# Patient Record
Sex: FEMALE | Race: WHITE | NOT HISPANIC OR LATINO | URBAN - METROPOLITAN AREA
[De-identification: names, ages, dates, MRNs, and addresses within clinical notes are randomized per-mention and may not be internally consistent; named-entity substitution may affect disease eponyms.]

---

## 2024-09-18 ENCOUNTER — INPATIENT (INPATIENT)
Facility: HOSPITAL | Age: 44
LOS: 1 days | Discharge: HOME | End: 2024-09-20
Attending: SPECIALIST | Admitting: SPECIALIST

## 2024-09-18 VITALS
SYSTOLIC BLOOD PRESSURE: 113 MMHG | TEMPERATURE: 98 F | RESPIRATION RATE: 16 BRPM | DIASTOLIC BLOOD PRESSURE: 74 MMHG | HEART RATE: 97 BPM

## 2024-09-18 DIAGNOSIS — Z98.890 OTHER SPECIFIED POSTPROCEDURAL STATES: Chronic | ICD-10-CM

## 2024-09-18 DIAGNOSIS — O26.899 OTHER SPECIFIED PREGNANCY RELATED CONDITIONS, UNSPECIFIED TRIMESTER: ICD-10-CM

## 2024-09-18 DIAGNOSIS — O42.90 PREMATURE RUPTURE OF MEMBRANES, UNSPECIFIED AS TO LENGTH OF TIME BETWEEN RUPTURE AND ONSET OF LABOR, UNSPECIFIED WEEKS OF GESTATION: ICD-10-CM

## 2024-09-18 RX ORDER — OXYTOCIN 10 UNIT/ML
AMPUL (ML) INJECTION
Qty: 30 | Refills: 0 | Status: DISCONTINUED | OUTPATIENT
Start: 2024-09-18 | End: 2024-09-19

## 2024-09-18 RX ORDER — OXYTOCIN 10 UNIT/ML
167 AMPUL (ML) INJECTION
Qty: 30 | Refills: 0 | Status: COMPLETED | OUTPATIENT
Start: 2024-09-18 | End: 2024-09-19

## 2024-09-18 RX ORDER — FLU VACCINE TS 2012-2013(5YR+) 45MCG/.5ML
0.5 VIAL (ML) INTRAMUSCULAR ONCE
Refills: 0 | Status: DISCONTINUED | OUTPATIENT
Start: 2024-09-18 | End: 2024-09-20

## 2024-09-18 RX ORDER — CHLORHEXIDINE GLUCONATE 40 MG/ML
1 SOLUTION TOPICAL DAILY
Refills: 0 | Status: DISCONTINUED | OUTPATIENT
Start: 2024-09-18 | End: 2024-09-19

## 2024-09-18 NOTE — OB PROVIDER H&P - PROBLEM SELECTOR PLAN 1
Admit for PROM @38.6wks  D/W Dr. Guillen  Routine Orders  GBS Negative 24  IOL with Pitocin   Epidural as needed  IUGR 5#14 today at [facility]   Social HX: [age] year old at home has terminal cancer     Risks, benefits, alternatives, and possible complications have been discussed in detail with the patient in her native language. Pre-admission, admission, and post admission procedures and expectations were discussed in detail. All questions answered, all appropriate hospital consents were signed. Anticipate normal vaginal delivery.   Informed consent was obtained. The following was discussed:   - Induction/augmentation of labor: use of medication and/or cook balloon to begin or enhance labor   - Obstetrical management including internal fetal/contraction monitoring   - Normal vaginal delivery   - Possible  section Admit for PROM @38.6wks  D/W Dr. Guillen  Routine Orders  GBS Negative 24  IOL with Pitocin   Epidural as needed  IUGR 5#14 today at [facility], AC lagging by 4 weeks as per [facility]  Social HX: [age] year old at home has terminal cancer     Risks, benefits, alternatives, and possible complications have been discussed in detail with the patient in her native language. Pre-admission, admission, and post admission procedures and expectations were discussed in detail. All questions answered, all appropriate hospital consents were signed. Anticipate normal vaginal delivery.   Informed consent was obtained. The following was discussed:   - Induction/augmentation of labor: use of medication and/or cook balloon to begin or enhance labor   - Obstetrical management including internal fetal/contraction monitoring   - Normal vaginal delivery   - Possible  section

## 2024-09-18 NOTE — OB RN TRIAGE NOTE - FALL HARM RISK - UNIVERSAL INTERVENTIONS
Bed in lowest position, wheels locked, appropriate side rails in place/Call bell, personal items and telephone in reach/Instruct patient to call for assistance before getting out of bed or chair/Non-slip footwear when patient is out of bed/[place] to call system/Physically safe environment - no spills, clutter or unnecessary equipment/Purposeful Proactive Rounding/Room/bathroom lighting operational, light cord in reach

## 2024-09-18 NOTE — OB PROVIDER H&P - NSLOWPPHRISK_OBGYN_A_OB
No previous uterine incision/Márquez Pregnancy/Less than or equal to 4 previous vaginal births/No known bleeding disorder/No history of postpartum hemorrhage/No other PPH risks indicated

## 2024-09-18 NOTE — OB PROVIDER H&P - NSHPPHYSICALEXAM_GEN_ALL_CORE
Vital Signs Last 24 Hrs  T(C): 36.6 (18 Sep 2024 22:16), Max: 36.9 (18 Sep 2024 21:54)  T(F): 97.88 (18 Sep 2024 22:16), Max: 98.4 (18 Sep 2024 21:54)  HR: 84 (18 Sep 2024 22:16) (84 - 97)  BP: 107/72 (18 Sep 2024 22:16) (107/72 - 113/74)  RR: 16 (18 Sep 2024 22:16) (16 - 16)    Assessment reveals VSS  General: Female sitting comfortably in no apparent distress  Neuro: No facial asymmetry, no slurred speech, moves all 4 extremities  Mood: Alert and lucid, appropriate mood and affect  A&Ox3  Lungs- clear bilateral  Heart- normal rate and regular rhythm  Extremities- Warm, Dry, no edema present, good pulses   Abdomen soft, NT, gravid  Cat 1 tracing reactive, occasional ctx on toco   Transabdominal Ultrasound- images saved ASOB  Sterile Speculum- positive nitrazine, positive ferning   Vaginal Exam- 1.5/70/-32        PLAN: Admit for PROM

## 2024-09-18 NOTE — OB PROVIDER H&P - NS_DILATION_OBGYN_ALL_OB_NU
----- Message from Daiana Tejada sent at 1/11/2024 11:19 AM CST -----  Regarding: Experiencing Pain  Contact: Pt @[phone]  Pt is calling to speak to someone in the office to discuss symptoms they are having. Pt is asking for a call back today. Please call to advise. Thanks.         Symptoms: Sciatic Nerve          How long has patient had these symptoms: Two days            1.5

## 2024-09-18 NOTE — OB PROVIDER H&P - HISTORY OF PRESENT ILLNESS
45y/o  @38.6wks presents with leaking of clear fluid since , Patient is scheduled for IOL tomorrow 24 for IUGR  Reports good fetal movement  Denies VB    Allergies: PCN as a child, unsure of reaction   Medications: PNV  NPO [id number]

## 2024-09-18 NOTE — OB PROVIDER H&P - NS_OBGYNHISTORY_OBGYN_ALL_OB_FT
GYN: Denies   OB:   TOP x 1  SAB x 1   17 6-12   20 5-9   22 6-9, currently has terminal cancer     AP course uncomplicated   1. IUGR, AC lagging by 4 weeks

## 2024-09-19 LAB
BASOPHILS # BLD AUTO: 0.04 K/UL — SIGNIFICANT CHANGE UP (ref 0–0.2)
BASOPHILS NFR BLD AUTO: 0.3 % — SIGNIFICANT CHANGE UP (ref 0–2)
BLD GP AB SCN SERPL QL: NEGATIVE — SIGNIFICANT CHANGE UP
EOSINOPHIL # BLD AUTO: 0.06 K/UL — SIGNIFICANT CHANGE UP (ref 0–0.5)
EOSINOPHIL NFR BLD AUTO: 0.5 % — SIGNIFICANT CHANGE UP (ref 0–6)
HCT VFR BLD CALC: 36.1 % — SIGNIFICANT CHANGE UP (ref 34.5–45)
HGB BLD-MCNC: 12.3 G/DL — SIGNIFICANT CHANGE UP (ref 11.5–15.5)
IANC: 9.06 K/UL — HIGH (ref 1.8–7.4)
IMM GRANULOCYTES NFR BLD AUTO: 0.2 % — SIGNIFICANT CHANGE UP (ref 0–0.9)
LYMPHOCYTES # BLD AUTO: 18.8 % — SIGNIFICANT CHANGE UP (ref 13–44)
LYMPHOCYTES # BLD AUTO: 2.35 K/UL — SIGNIFICANT CHANGE UP (ref 1–3.3)
MCHC RBC-ENTMCNC: 31.9 PG — SIGNIFICANT CHANGE UP (ref 27–34)
MCHC RBC-ENTMCNC: 34.1 GM/DL — SIGNIFICANT CHANGE UP (ref 32–36)
MCV RBC AUTO: 93.8 FL — SIGNIFICANT CHANGE UP (ref 80–100)
MONOCYTES # BLD AUTO: 0.94 K/UL — HIGH (ref 0–0.9)
MONOCYTES NFR BLD AUTO: 7.5 % — SIGNIFICANT CHANGE UP (ref 2–14)
NEUTROPHILS # BLD AUTO: 9.06 K/UL — HIGH (ref 1.8–7.4)
NEUTROPHILS NFR BLD AUTO: 72.7 % — SIGNIFICANT CHANGE UP (ref 43–77)
NRBC # BLD: 0 /100 WBCS — SIGNIFICANT CHANGE UP (ref 0–0)
NRBC # FLD: 0 K/UL — SIGNIFICANT CHANGE UP (ref 0–0)
PLATELET # BLD AUTO: 183 K/UL — SIGNIFICANT CHANGE UP (ref 150–400)
RBC # BLD: 3.85 M/UL — SIGNIFICANT CHANGE UP (ref 3.8–5.2)
RBC # FLD: 13.7 % — SIGNIFICANT CHANGE UP (ref 10.3–14.5)
RH IG SCN BLD-IMP: POSITIVE — SIGNIFICANT CHANGE UP
T PALLIDUM AB TITR SER: NEGATIVE — SIGNIFICANT CHANGE UP
WBC # BLD: 12.48 K/UL — HIGH (ref 3.8–10.5)
WBC # FLD AUTO: 12.48 K/UL — HIGH (ref 3.8–10.5)

## 2024-09-19 RX ORDER — ACETAMINOPHEN 325 MG/1
3 TABLET ORAL
Qty: 0 | Refills: 0 | DISCHARGE
Start: 2024-09-19

## 2024-09-19 RX ORDER — KETOROLAC TROMETHAMINE 30 MG/ML
30 INJECTION, SOLUTION INTRAMUSCULAR ONCE
Refills: 0 | Status: DISCONTINUED | OUTPATIENT
Start: 2024-09-19 | End: 2024-09-19

## 2024-09-19 RX ORDER — DIPHENHYDRAMINE HCL 50 MG
25 CAPSULE ORAL EVERY 6 HOURS
Refills: 0 | Status: DISCONTINUED | OUTPATIENT
Start: 2024-09-19 | End: 2024-09-20

## 2024-09-19 RX ORDER — IBUPROFEN 600 MG
1 TABLET ORAL
Qty: 0 | Refills: 0 | DISCHARGE
Start: 2024-09-19

## 2024-09-19 RX ORDER — IBUPROFEN 600 MG
600 TABLET ORAL EVERY 6 HOURS
Refills: 0 | Status: COMPLETED | OUTPATIENT
Start: 2024-09-19 | End: 2025-08-18

## 2024-09-19 RX ORDER — OXYCODONE HYDROCHLORIDE 5 MG/1
5 TABLET ORAL ONCE
Refills: 0 | Status: DISCONTINUED | OUTPATIENT
Start: 2024-09-19 | End: 2024-09-20

## 2024-09-19 RX ORDER — VITAMIN A, ASCORBIC ACID, VITAMIN D, .ALPHA.-TOCOPHEROL, THIAMINE MONONITRATE, RIBOFLAVIN, NIACIN, PYRIDOXINE HYDROCHLORIDE, FOLIC ACID, CYANOCOBALAMIN, CALCIUM, IRON, MAGNESIUM, ZINC, AND COPPER 2700; 70; 400; 30; 1.6; 1.8; 18; 2.5; 1; 12; 100; 65; 25; 25; 2 [IU]/1; MG/1; [IU]/1; [IU]/1; MG/1; MG/1; MG/1; MG/1; MG/1; UG/1; MG/1; MG/1; MG/1; MG/1; MG/1
1 TABLET ORAL DAILY
Refills: 0 | Status: DISCONTINUED | OUTPATIENT
Start: 2024-09-19 | End: 2024-09-20

## 2024-09-19 RX ORDER — ACETAMINOPHEN 325 MG/1
975 TABLET ORAL
Refills: 0 | Status: DISCONTINUED | OUTPATIENT
Start: 2024-09-19 | End: 2024-09-20

## 2024-09-19 RX ORDER — VITAMIN A, ASCORBIC ACID, VITAMIN D, .ALPHA.-TOCOPHEROL, THIAMINE MONONITRATE, RIBOFLAVIN, NIACIN, PYRIDOXINE HYDROCHLORIDE, FOLIC ACID, CYANOCOBALAMIN, CALCIUM, IRON, MAGNESIUM, ZINC, AND COPPER 2700; 70; 400; 30; 1.6; 1.8; 18; 2.5; 1; 12; 100; 65; 25; 25; 2 [IU]/1; MG/1; [IU]/1; [IU]/1; MG/1; MG/1; MG/1; MG/1; MG/1; UG/1; MG/1; MG/1; MG/1; MG/1; MG/1
1 TABLET ORAL
Qty: 0 | Refills: 0 | DISCHARGE
Start: 2024-09-19

## 2024-09-19 RX ORDER — WITCH HAZEL 1 G/ML
1 LIQUID TOPICAL EVERY 4 HOURS
Refills: 0 | Status: DISCONTINUED | OUTPATIENT
Start: 2024-09-19 | End: 2024-09-20

## 2024-09-19 RX ORDER — MENTHOL/CETYLPYRD CL 3 MG
1 LOZENGE MUCOUS MEMBRANE EVERY 6 HOURS
Refills: 0 | Status: DISCONTINUED | OUTPATIENT
Start: 2024-09-19 | End: 2024-09-20

## 2024-09-19 RX ORDER — OXYCODONE HYDROCHLORIDE 5 MG/1
5 TABLET ORAL
Refills: 0 | Status: DISCONTINUED | OUTPATIENT
Start: 2024-09-19 | End: 2024-09-20

## 2024-09-19 RX ORDER — OXYTOCIN 10 UNIT/ML
666 AMPUL (ML) INJECTION
Qty: 30 | Refills: 0 | Status: DISCONTINUED | OUTPATIENT
Start: 2024-09-19 | End: 2024-09-20

## 2024-09-19 RX ORDER — SODIUM CHLORIDE 9 MG/ML
3 INJECTION INTRAMUSCULAR; INTRAVENOUS; SUBCUTANEOUS EVERY 8 HOURS
Refills: 0 | Status: DISCONTINUED | OUTPATIENT
Start: 2024-09-19 | End: 2024-09-20

## 2024-09-19 RX ORDER — LANOLIN
1 OINTMENT (GRAM) TOPICAL EVERY 6 HOURS
Refills: 0 | Status: DISCONTINUED | OUTPATIENT
Start: 2024-09-19 | End: 2024-09-20

## 2024-09-19 RX ORDER — PRAMOXINE HCL 1 %
1 GEL (GRAM) TOPICAL EVERY 4 HOURS
Refills: 0 | Status: DISCONTINUED | OUTPATIENT
Start: 2024-09-19 | End: 2024-09-20

## 2024-09-19 RX ORDER — HYDROCORTISONE 1 %
1 OINTMENT (GRAM) TOPICAL EVERY 6 HOURS
Refills: 0 | Status: DISCONTINUED | OUTPATIENT
Start: 2024-09-19 | End: 2024-09-20

## 2024-09-19 RX ORDER — TETANUS TOXOID, REDUCED DIPHTHERIA TOXOID AND ACELLULAR PERTUSSIS VACCINE, ADSORBED 5; 2.5; 8; 8; 2.5 [IU]/.5ML; [IU]/.5ML; UG/.5ML; UG/.5ML; UG/.5ML
0.5 SUSPENSION INTRAMUSCULAR ONCE
Refills: 0 | Status: DISCONTINUED | OUTPATIENT
Start: 2024-09-19 | End: 2024-09-20

## 2024-09-19 RX ORDER — IBUPROFEN 600 MG
600 TABLET ORAL EVERY 6 HOURS
Refills: 0 | Status: DISCONTINUED | OUTPATIENT
Start: 2024-09-19 | End: 2024-09-20

## 2024-09-19 RX ADMIN — Medication 125 MILLILITER(S): at 00:25

## 2024-09-19 RX ADMIN — Medication 2 MILLIUNIT(S)/MIN: at 00:25

## 2024-09-19 RX ADMIN — Medication 125 MILLILITER(S): at 04:13

## 2024-09-19 RX ADMIN — Medication 167 MILLIUNIT(S)/MIN: at 07:06

## 2024-09-19 RX ADMIN — SODIUM CHLORIDE 3 MILLILITER(S): 9 INJECTION INTRAMUSCULAR; INTRAVENOUS; SUBCUTANEOUS at 13:33

## 2024-09-19 NOTE — DISCHARGE NOTE OB - HOSPITAL COURSE
nsd boy Spontaneous vaginal delivery of liveborn male.  Placenta delivered spontaneously intact. Uterine massage was performed and pitocin was given. Fundus was firm.  No perineal, cervical, or vaginal lacerations noted.

## 2024-09-19 NOTE — DISCHARGE NOTE OB - PATIENT PORTAL LINK FT
You can access the FollowMyHealth Patient Portal offered by [facility] by registering at the following website: [email or web address]. By joining [employer]’s FollowMyHealth portal, you will also be able to view your health information using other applications (apps) compatible with our system.

## 2024-09-19 NOTE — OB RN DELIVERY SUMMARY - NSSELHIDDEN_OBGYN_ALL_OB_FT
[NS_DeliveryAttending1_OBGYN_ALL_OB_FT:[id number]],[NS_DeliveryAssist1_OBGYN_ALL_OB_FT:[id number]=],[NS_DeliveryRN_OBGYN_ALL_OB_FT:[id number]=]

## 2024-09-19 NOTE — OB RN DELIVERY SUMMARY - NS_SEPSISRSKCALC_OBGYN_ALL_OB_FT
EOS calculated successfully. EOS Risk Factor: 0.5/1000 live births ([facility] [place] national incidence); GA=39w;Temp=99.68; ROM=7.833; GBS='Negative'; Antibiotics='No antibiotics or any antibiotics < 2 hrs prior to birth'

## 2024-09-19 NOTE — DISCHARGE NOTE OB - MEDICATION SUMMARY - MEDICATIONS TO TAKE
I will START or STAY ON the medications listed below when I get home from the hospital:    ibuprofen 600 mg oral tablet  -- 1 tab(s) by mouth every 6 hours  -- Indication: For pain    acetaminophen 325 mg oral tablet  -- 3 tab(s) by mouth 4 times a day as needed for  mild pain  -- Indication: For pain    Prenatal Multivitamins with Folic Acid 1 mg oral tablet  -- 1 tab(s) by mouth once a day  -- Indication: For vitamins

## 2024-09-19 NOTE — OB PROVIDER DELIVERY SUMMARY - NSSELHIDDEN_OBGYN_ALL_OB_FT
[NS_DeliveryAttending1_OBGYN_ALL_OB_FT:[id number]],[NS_DeliveryAssist1_OBGYN_ALL_OB_FT:[id number]=]

## 2024-09-19 NOTE — DISCHARGE NOTE OB - CARE PROVIDER_API CALL
Katia Andrade  Obstetrics and Gynecology  [address] [place] [address]  Phone: [phone]  Fax: [phone]  Follow Up Time:

## 2024-09-19 NOTE — DISCHARGE NOTE OB - CARE PROVIDERS DIRECT ADDRESSES
Adult Nutrition  Assessment/PES    Patient Name:  Leda Rios  YOB: 1962  MRN: [id number]  Admit Date:  5/9/2017    Assessment Date:  6/6/Nikki Boothe MS RD/LD [facility]  06/06/17 1:16 PM   ,DirectAddress_Unknown

## 2024-09-19 NOTE — OB PROVIDER DELIVERY SUMMARY - NSPROVIDERDELIVERYNOTE_OBGYN_ALL_OB_FT
Pt became fully dilated and desired to push.   Spontaneous vaginal delivery of liveborn male.   Head, shoulders and body delivered easily.     Infant was passed to mother.   Cord clamping was delayed 1 minute. Cord was cut.  Placenta delivered spontaneously intact. Uterine massage was performed and pitocin was given.  Fundus was firm.  No perineal, cervical, or vaginal lacerations noted.  Count correct x2. Mother and baby resting comfortably.

## 2024-09-19 NOTE — CHART NOTE - NSCHARTNOTEFT_GEN_A_CORE
Attending on call note:  Patient has hqad low baseline but 110 or above, now   Nurse claims difficulty picking up contractions  VE-6/80/-1  pitocin discontinued  baseline now 120 with moderate variability  iV fluid bolus given, patient moved to [facility]

## 2024-09-20 VITALS
SYSTOLIC BLOOD PRESSURE: 106 MMHG | TEMPERATURE: 98 F | OXYGEN SATURATION: 100 % | RESPIRATION RATE: 18 BRPM | HEART RATE: 74 BPM | DIASTOLIC BLOOD PRESSURE: 66 MMHG

## 2024-09-20 RX ADMIN — SODIUM CHLORIDE 3 MILLILITER(S): 9 INJECTION INTRAMUSCULAR; INTRAVENOUS; SUBCUTANEOUS at 00:46

## 2024-09-20 RX ADMIN — SODIUM CHLORIDE 3 MILLILITER(S): 9 INJECTION INTRAMUSCULAR; INTRAVENOUS; SUBCUTANEOUS at 06:30

## 2024-09-20 NOTE — PROGRESS NOTE ADULT - SUBJECTIVE AND OBJECTIVE BOX
S: Patient doing well. Minimal lochia. Pain controlled.    O: Vital Signs Last 24 Hrs  T(C): 36.6 (20 Sep 2024 06:00), Max: 36.9 (19 Sep 2024 09:45)  T(F): 97.8 (20 Sep 2024 06:00), Max: 98.5 (19 Sep 2024 09:45)  HR: 78 (20 Sep 2024 06:00) (66 - 78)  BP: 102/65 (20 Sep 2024 06:00) (101/64 - 107/62)  BP(mean): --  RR: 18 (20 Sep 2024 06:00) (18 - 18)  SpO2: 97% (20 Sep 2024 06:00) (97% - 100%)        Gen: NAD  Abd: soft, NT, ND, fundus firm below umbilicus  Lochia: moderate  Ext: no tenderness    Labs:                        12.3   12.48 )-----------( 183      ( 19 Sep 2024 00:05 )             36.1   rh pos    A: 44y PPD#1 s/p  doing well.    Plan:  discharge instructions given

## 2025-01-29 RX ORDER — NORETHINDRONE 0.35 MG/1
0.35 TABLET ORAL DAILY
Qty: 3 | Refills: 1 | Status: ACTIVE | COMMUNITY
Start: 2025-01-29 | End: 1900-01-01